# Patient Record
Sex: FEMALE | Race: WHITE | Employment: STUDENT | URBAN - NONMETROPOLITAN AREA
[De-identification: names, ages, dates, MRNs, and addresses within clinical notes are randomized per-mention and may not be internally consistent; named-entity substitution may affect disease eponyms.]

---

## 2023-08-30 ENCOUNTER — OFFICE VISIT (OUTPATIENT)
Age: 23
End: 2023-08-30

## 2023-08-30 VITALS
WEIGHT: 135 LBS | HEIGHT: 62 IN | HEART RATE: 80 BPM | RESPIRATION RATE: 18 BRPM | SYSTOLIC BLOOD PRESSURE: 116 MMHG | OXYGEN SATURATION: 98 % | BODY MASS INDEX: 24.84 KG/M2 | TEMPERATURE: 97.7 F | DIASTOLIC BLOOD PRESSURE: 78 MMHG

## 2023-08-30 DIAGNOSIS — Z00.00 WELL ADULT EXAM: Primary | ICD-10-CM

## 2023-08-30 PROCEDURE — 99385 PREV VISIT NEW AGE 18-39: CPT | Performed by: STUDENT IN AN ORGANIZED HEALTH CARE EDUCATION/TRAINING PROGRAM

## 2023-08-30 RX ORDER — RIZATRIPTAN BENZOATE 10 MG/1
10 TABLET ORAL PRN
COMMUNITY
Start: 2023-04-21 | End: 2024-04-20

## 2023-08-30 RX ORDER — DROSPIRENONE AND ETHINYL ESTRADIOL 0.02-3(28)
KIT ORAL
COMMUNITY

## 2023-08-30 SDOH — ECONOMIC STABILITY: FOOD INSECURITY: WITHIN THE PAST 12 MONTHS, THE FOOD YOU BOUGHT JUST DIDN'T LAST AND YOU DIDN'T HAVE MONEY TO GET MORE.: NEVER TRUE

## 2023-08-30 SDOH — ECONOMIC STABILITY: FOOD INSECURITY: WITHIN THE PAST 12 MONTHS, YOU WORRIED THAT YOUR FOOD WOULD RUN OUT BEFORE YOU GOT MONEY TO BUY MORE.: NEVER TRUE

## 2023-08-30 SDOH — ECONOMIC STABILITY: HOUSING INSECURITY
IN THE LAST 12 MONTHS, WAS THERE A TIME WHEN YOU DID NOT HAVE A STEADY PLACE TO SLEEP OR SLEPT IN A SHELTER (INCLUDING NOW)?: NO

## 2023-08-30 SDOH — ECONOMIC STABILITY: INCOME INSECURITY: HOW HARD IS IT FOR YOU TO PAY FOR THE VERY BASICS LIKE FOOD, HOUSING, MEDICAL CARE, AND HEATING?: NOT HARD AT ALL

## 2023-08-30 ASSESSMENT — PATIENT HEALTH QUESTIONNAIRE - PHQ9
2. FEELING DOWN, DEPRESSED OR HOPELESS: 0
SUM OF ALL RESPONSES TO PHQ9 QUESTIONS 1 & 2: 0
SUM OF ALL RESPONSES TO PHQ QUESTIONS 1-9: 0
1. LITTLE INTEREST OR PLEASURE IN DOING THINGS: 0
SUM OF ALL RESPONSES TO PHQ QUESTIONS 1-9: 0

## 2023-08-30 ASSESSMENT — ENCOUNTER SYMPTOMS
SHORTNESS OF BREATH: 0
NAUSEA: 0
VOMITING: 0
COUGH: 0
DIARRHEA: 0
CONSTIPATION: 0

## 2023-08-30 NOTE — PROGRESS NOTES
Negative for fatigue and fever. Respiratory:  Negative for cough and shortness of breath. Cardiovascular:  Negative for chest pain and leg swelling. Gastrointestinal:  Negative for constipation, diarrhea, nausea and vomiting. Genitourinary:  Negative for decreased urine volume and dysuria. Neurological:  Negative for dizziness and headaches. Psychiatric/Behavioral:  Negative for dysphoric mood and sleep disturbance. The patient is not nervous/anxious. Objective: There were no vitals filed for this visit. There is no height or weight on file to calculate BMI. Wt Readings from Last 3 Encounters:   No data found for Wt     BP Readings from Last 3 Encounters:   No data found for BP       Physical Exam  Constitutional:       Appearance: Normal appearance. HENT:      Head: Normocephalic and atraumatic. Right Ear: Tympanic membrane, ear canal and external ear normal.      Left Ear: Tympanic membrane, ear canal and external ear normal.      Nose: Nose normal.      Mouth/Throat:      Mouth: Mucous membranes are moist.      Pharynx: Oropharynx is clear. Eyes:      Extraocular Movements: Extraocular movements intact. Conjunctiva/sclera: Conjunctivae normal.      Pupils: Pupils are equal, round, and reactive to light. Cardiovascular:      Rate and Rhythm: Normal rate and regular rhythm. Pulses: Normal pulses. Heart sounds: Normal heart sounds. Pulmonary:      Effort: Pulmonary effort is normal.      Breath sounds: Normal breath sounds. Abdominal:      General: Abdomen is flat. Bowel sounds are normal.      Palpations: Abdomen is soft. Musculoskeletal:         General: Normal range of motion. Cervical back: Normal range of motion. Lymphadenopathy:      Cervical: No cervical adenopathy. Neurological:      Mental Status: She is alert. Mental status is at baseline. Cranial Nerves: No cranial nerve deficit. Sensory: No sensory deficit.       Motor: No

## 2023-09-01 LAB
INDURATION: NORMAL
TB SKIN TEST: NEGATIVE

## 2023-12-12 ENCOUNTER — OFFICE VISIT (OUTPATIENT)
Age: 23
End: 2023-12-12

## 2023-12-12 VITALS
TEMPERATURE: 99.3 F | BODY MASS INDEX: 23.41 KG/M2 | RESPIRATION RATE: 18 BRPM | DIASTOLIC BLOOD PRESSURE: 80 MMHG | SYSTOLIC BLOOD PRESSURE: 118 MMHG | HEART RATE: 89 BPM | WEIGHT: 128 LBS | OXYGEN SATURATION: 98 %

## 2023-12-12 DIAGNOSIS — U07.1 COVID-19: Primary | ICD-10-CM

## 2023-12-12 PROBLEM — G43.909 MIGRAINE HEADACHE: Status: ACTIVE | Noted: 2022-06-29

## 2023-12-12 PROBLEM — M41.9 SCOLIOSIS: Status: ACTIVE | Noted: 2017-08-28

## 2023-12-12 PROBLEM — N20.0 KIDNEY STONE: Status: ACTIVE | Noted: 2021-04-07

## 2023-12-12 PROBLEM — E21.3 HYPERPARATHYROIDISM (HCC): Status: ACTIVE | Noted: 2023-03-22

## 2023-12-12 LAB
INFLUENZA VIRUS A RNA: NEGATIVE
INFLUENZA VIRUS B RNA: NEGATIVE
Lab: ABNORMAL
QC PASS/FAIL: ABNORMAL
SARS-COV-2 RDRP RESP QL NAA+PROBE: POSITIVE

## 2023-12-12 PROCEDURE — 87502 INFLUENZA DNA AMP PROBE: CPT | Performed by: NURSE PRACTITIONER

## 2023-12-12 PROCEDURE — 87635 SARS-COV-2 COVID-19 AMP PRB: CPT | Performed by: NURSE PRACTITIONER

## 2023-12-12 PROCEDURE — 99213 OFFICE O/P EST LOW 20 MIN: CPT | Performed by: NURSE PRACTITIONER

## 2023-12-12 ASSESSMENT — ENCOUNTER SYMPTOMS
SORE THROAT: 1
NAUSEA: 0
WHEEZING: 0
VOMITING: 1
DIARRHEA: 0
SHORTNESS OF BREATH: 0
COUGH: 1

## 2023-12-12 NOTE — PROGRESS NOTES
Congestion, headache, passed out in shower yesterday and then vomited after. Slight cough, fatigued, and weak feeling. Symptoms started Saturday with headache and sore throat. Using Dayquil and Nyquill.

## 2023-12-12 NOTE — PROGRESS NOTES
[id number]  Dept: [phone]  Loc: [phone]     Dre Calle is a 26 y.o. female who presents today for:  No chief complaint on file. Assessment/Plan:     1. COVID-19  -Go to the ER immediately if you experience trouble breathing, chest pain, or worsening/more persistent dizziness. Seek higher level of care if symptoms worsen. Increase intake of clear liquids.    -discussed syncopal event yesterday. Discussed my concerns r/t covid and cardiac system- pt does not feel need to seek higher level of care at this time, states this happens sometimes when she's sick. Instructed pt to seek higher level of care for further eval if this recurs, pt verbalized understanding. Pt states she lives with sister who will watch her and can drive her to higher level of care if needed. - POCT COVID-19 Rapid, NAAT  - POCT Influenza A/B DNA ([facility] i)       Results for orders placed or performed in visit on 12/12/23   POCT COVID-19 Rapid, NAAT   Result Value Ref Range    SARS-COV-2, RdRp gene Positive (A) Negative    Lot Number [id number]     QC Pass/Fail Pass    POCT Influenza A/B DNA ([facility] i)   Result Value Ref Range    Influenza virus A RNA Negative     Influenza virus B RNA Negative         Medications Ordered:  No orders of the defined types were placed in this encounter. No follow-ups on file. No future appointments. HPI:   Pt presents with congestion, HA, cough, fatigue, sore throat, chills x4 days. Had syncopal event yesterday while in the shower- states she vomited prior to passing out. Did not hit head. Denies chest pain, palpitations, SOB, and trouble breathing. Pt states she has passed out before d/t illness. Denies fever. Has taken DayQuil with some relief- last dose 6.5 hours ago. Pt states dizziness only lasts a couple of seconds right after she stands up.       Subjective:

## 2023-12-13 ENCOUNTER — TELEPHONE (OUTPATIENT)
Age: 23
End: 2023-12-13